# Patient Record
Sex: MALE | Race: WHITE | URBAN - METROPOLITAN AREA
[De-identification: names, ages, dates, MRNs, and addresses within clinical notes are randomized per-mention and may not be internally consistent; named-entity substitution may affect disease eponyms.]

---

## 2021-10-26 ENCOUNTER — OFFICE VISIT (OUTPATIENT)
Dept: URBAN - METROPOLITAN AREA CLINIC 24 | Facility: CLINIC | Age: 71
End: 2021-10-26
Payer: MEDICARE

## 2021-10-26 DIAGNOSIS — H04.123 TEAR FILM INSUFFICIENCY OF BILATERAL LACRIMAL GLANDS: ICD-10-CM

## 2021-10-26 DIAGNOSIS — H25.813 COMBINED FORMS OF AGE-RELATED CATARACT, BILATERAL: Primary | ICD-10-CM

## 2021-10-26 DIAGNOSIS — H35.3131 NONEXUDATIVE MACULAR DEGENERATION, EARLY DRY STAGE, BILATERAL: ICD-10-CM

## 2021-10-26 PROCEDURE — 92134 CPTRZ OPH DX IMG PST SGM RTA: CPT | Performed by: OPTOMETRIST

## 2021-10-26 PROCEDURE — 92004 COMPRE OPH EXAM NEW PT 1/>: CPT | Performed by: OPTOMETRIST

## 2021-10-26 ASSESSMENT — INTRAOCULAR PRESSURE
OD: 23
OS: 22

## 2021-10-26 ASSESSMENT — KERATOMETRY
OD: 42.88
OS: 43.10

## 2021-10-26 ASSESSMENT — VISUAL ACUITY
OS: 20/25
OD: 20/30

## 2021-10-26 NOTE — IMPRESSION/PLAN
Impression: Combined forms of age-related cataract, bilateral: H25.813. Plan: Cataract accounts for a portion patient's complaints. Discussed options, surgery or spectacle change, explained surgery risks, benefits.  Patient defers surgery and elects to change glasses first.  Will re-evaluate cataract(s) on return visit

## 2021-10-26 NOTE — IMPRESSION/PLAN
Impression: Nonexudative macular degeneration, early dry stage, bilateral: H35.3131. Plan: Recommend patient take an [employer]'s formula multiple vitamin daily. Observation is all that is indicated at this time. Patient to monitor vision changes with [place]ler grid. RTC immediately if any changes in vision experienced.

## 2021-10-26 NOTE — IMPRESSION/PLAN
Impression: Tear film insufficiency of bilateral lacrimal glands, blepharitis OU Plan: Explained condition to patient. Recommend warm compresses, lid scrubs (recommend Avenova), and artificial tears QID or more. Will monitor patient for now. RTC if no improvement or worsens.

## 2023-01-10 ENCOUNTER — OFFICE VISIT (OUTPATIENT)
Dept: URBAN - METROPOLITAN AREA CLINIC 24 | Facility: CLINIC | Age: 73
End: 2023-01-10
Payer: MEDICARE

## 2023-01-10 DIAGNOSIS — H04.123 TEAR FILM INSUFFICIENCY OF BILATERAL LACRIMAL GLANDS: ICD-10-CM

## 2023-01-10 DIAGNOSIS — H40.053 OCULAR HYPERTENSION, BILATERAL: ICD-10-CM

## 2023-01-10 DIAGNOSIS — H25.813 COMBINED FORMS OF AGE-RELATED CATARACT, BILATERAL: ICD-10-CM

## 2023-01-10 DIAGNOSIS — H35.3131 NONEXUDATIVE AGE-RELATED MACULAR DEGENERATION, BILATERAL, EARLY DRY STAGE: Primary | ICD-10-CM

## 2023-01-10 PROCEDURE — 92014 COMPRE OPH EXAM EST PT 1/>: CPT | Performed by: OPTOMETRIST

## 2023-01-10 PROCEDURE — 92134 CPTRZ OPH DX IMG PST SGM RTA: CPT | Performed by: OPTOMETRIST

## 2023-01-10 PROCEDURE — 92133 CPTRZD OPH DX IMG PST SGM ON: CPT | Performed by: OPTOMETRIST

## 2023-01-10 PROCEDURE — 76514 ECHO EXAM OF EYE THICKNESS: CPT | Performed by: OPTOMETRIST

## 2023-01-10 ASSESSMENT — VISUAL ACUITY
OS: 20/25
OD: 20/30

## 2023-01-10 ASSESSMENT — KERATOMETRY
OS: 43.08
OD: 42.91

## 2023-01-10 ASSESSMENT — INTRAOCULAR PRESSURE
OS: 24
OD: 22

## 2023-01-10 NOTE — IMPRESSION/PLAN
Impression: Tear film insufficiency of bilateral lacrimal glands, blepharitis OU Plan: Continue warm compresses, lid scrubs (recommend Avenova), and artificial tears QID or more. Will monitor patient for now. RTC if no improvement or worsens.

## 2023-01-10 NOTE — IMPRESSION/PLAN
Impression: Nonexudative macular degeneration, early dry stage, bilateral: H35.3131. Plan: Irregular OD, concern for exudative changes, however, OCT stable, (-) Hemes. Continue [employer]'s formula multiple vitamin daily. Observation is all that is indicated at this time. Patient to monitor vision changes with [place]msler grid. RTC immediately if any changes in vision experienced.

## 2023-01-10 NOTE — IMPRESSION/PLAN
Impression: Ocular hypertension, bilateral: H40.053. Plan: IOP 22/24 today with THICK pachs OU. OCT RNFL WNL OU. Good rim tissue OU. No glc at this time. Will CTM.